# Patient Record
Sex: MALE | Race: WHITE | NOT HISPANIC OR LATINO | Employment: FULL TIME | URBAN - METROPOLITAN AREA
[De-identification: names, ages, dates, MRNs, and addresses within clinical notes are randomized per-mention and may not be internally consistent; named-entity substitution may affect disease eponyms.]

---

## 2022-05-19 DIAGNOSIS — N52.9 ERECTILE DYSFUNCTION: Primary | ICD-10-CM

## 2022-05-19 DIAGNOSIS — N40.1 BPH WITH URINARY OBSTRUCTION: ICD-10-CM

## 2022-05-19 DIAGNOSIS — N13.8 BPH WITH URINARY OBSTRUCTION: ICD-10-CM

## 2022-06-29 ENCOUNTER — OFFICE VISIT (OUTPATIENT)
Dept: UROLOGY | Facility: CLINIC | Age: 77
End: 2022-06-29
Payer: MEDICARE

## 2022-06-29 DIAGNOSIS — N40.1 BPH WITH URINARY OBSTRUCTION: ICD-10-CM

## 2022-06-29 DIAGNOSIS — N13.8 BPH WITH URINARY OBSTRUCTION: ICD-10-CM

## 2022-06-29 DIAGNOSIS — N52.9 ERECTILE DYSFUNCTION: ICD-10-CM

## 2022-06-29 DIAGNOSIS — N40.0 BENIGN PROSTATIC HYPERPLASIA, UNSPECIFIED WHETHER LOWER URINARY TRACT SYMPTOMS PRESENT: Primary | ICD-10-CM

## 2022-06-29 LAB
POC RESIDUAL URINE VOLUME: 0 ML (ref 0–100)
PSA, DIAGNOSTIC: 1.03 NG/ML (ref 0–4)

## 2022-06-29 PROCEDURE — 99204 PR OFFICE/OUTPT VISIT, NEW, LEVL IV, 45-59 MIN: ICD-10-PCS | Mod: S$GLB,,, | Performed by: NURSE PRACTITIONER

## 2022-06-29 PROCEDURE — 51798 US URINE CAPACITY MEASURE: CPT | Mod: S$GLB,,, | Performed by: NURSE PRACTITIONER

## 2022-06-29 PROCEDURE — 99204 OFFICE O/P NEW MOD 45 MIN: CPT | Mod: S$GLB,,, | Performed by: NURSE PRACTITIONER

## 2022-06-29 PROCEDURE — 51798 POCT BLADDER SCAN: ICD-10-PCS | Mod: S$GLB,,, | Performed by: NURSE PRACTITIONER

## 2022-06-29 RX ORDER — CLOTRIMAZOLE AND BETAMETHASONE DIPROPIONATE 10; .64 MG/G; MG/G
CREAM TOPICAL
COMMUNITY
Start: 2022-06-08

## 2022-06-29 RX ORDER — PRAVASTATIN SODIUM 10 MG/1
TABLET ORAL
COMMUNITY
Start: 2022-04-21

## 2022-06-29 RX ORDER — METFORMIN HYDROCHLORIDE 500 MG/1
TABLET, EXTENDED RELEASE ORAL
COMMUNITY
Start: 2022-05-18

## 2022-06-29 RX ORDER — SILDENAFIL 100 MG/1
TABLET, FILM COATED ORAL
COMMUNITY
Start: 2022-05-18

## 2022-06-29 RX ORDER — LISINOPRIL 20 MG/1
TABLET ORAL
COMMUNITY
Start: 2022-05-03

## 2022-06-29 RX ORDER — AMLODIPINE BESYLATE 10 MG/1
TABLET ORAL
COMMUNITY
Start: 2022-05-02

## 2022-06-29 RX ORDER — FINASTERIDE 5 MG/1
TABLET, FILM COATED ORAL
COMMUNITY
Start: 2022-04-21

## 2022-06-29 RX ORDER — TADALAFIL 20 MG/1
20 TABLET ORAL DAILY PRN
COMMUNITY
End: 2022-06-29

## 2022-06-29 RX ORDER — ALBUTEROL SULFATE 90 UG/1
AEROSOL, METERED RESPIRATORY (INHALATION)
COMMUNITY
Start: 2022-01-12

## 2022-06-29 RX ORDER — MONTELUKAST SODIUM 10 MG/1
TABLET ORAL
COMMUNITY
Start: 2022-05-17

## 2022-06-29 RX ORDER — ASPIRIN 81 MG/1
TABLET ORAL
COMMUNITY

## 2022-06-29 NOTE — PROGRESS NOTES
Subjective:       Patient ID: Eladio Mirza is a 76 y.o. male.    Chief Complaint: Benign Prostatic Hypertrophy (Also ED)      HPI: 76-year-old male new to the [facility] establish care.  He had been followed by Urology in [place].  He treated for a kidney stone which required a nephrostomy tube insertion with laser extraction of the stone.  This was a years back.  He has had no recurrence.  He is also followed for BPH with LUTS on finasteride 5 milligrams daily.  With good response.  He has a good stream which is easy to start.  He denies dysuria, frequency, urgency, incontinence or gross hematuria.  Rare nocturia.  Positive response to sildenafil 100 milligram p.r.n. for symptoms of erectile dysfunction.  No pain with ejaculation or blood in the semen.  No other urologic concerns.       Past Medical History:   Past Medical History:   Diagnosis Date    BPH (benign prostatic hyperplasia)     Cancer     leg    Hypercholesteremia     Hypertension     Kidney stone     Male erectile dysfunction, unspecified        Past Surgical Historical:   Past Surgical History:   Procedure Laterality Date    HEMORRHOID SURGERY      KIDNEY STONE SURGERY      LEG SURGERY Right     cancer        Medications:   Medication List with Changes/Refills   Current Medications    ALBUTEROL (PROVENTIL/VENTOLIN HFA) 90 MCG/ACTUATION INHALER    INHALE 2 PUFFS BY MOUTH EVERY 6 HOURS AS NEEDED FOR COUGH AND FOR WHEEZING AND FOR SHORTNESS OF BREATH    AMLODIPINE (NORVASC) 10 MG TABLET        ASPIRIN (ECOTRIN) 81 MG EC TABLET    aspirin 81 mg tablet,delayed release   Take 1 tablet every day by oral route.    CLOTRIMAZOLE-BETAMETHASONE 1-0.05% (LOTRISONE) CREAM    APPLY A SMALL AMOUNT OF CREAM TOPICALLY TO AFFECTED AREA TWICE DAILY FOR 7 DAYS    FINASTERIDE (PROSCAR) 5 MG TABLET        LISINOPRIL (PRINIVIL,ZESTRIL) 20 MG TABLET        METFORMIN (GLUCOPHAGE-XR) 500 MG ER 24HR TABLET        MONTELUKAST (SINGULAIR) 10 MG TABLET        PRAVASTATIN  (PRAVACHOL) 10 MG TABLET        SILDENAFIL (VIAGRA) 100 MG TABLET    TAKE 1 TABLET BY MOUTH AS DIRECTED 30 MINUTES TO 1 HOUR PRIOR TO INTERCOURSE    TADALAFIL (CIALIS) 20 MG TAB    Take 20 mg by mouth daily as needed.        Past Social History:   Social History     Socioeconomic History    Marital status:    Tobacco Use    Smoking status: Former Smoker    Smokeless tobacco: Never Used       Allergies: Review of patient's allergies indicates:  No Known Allergies     Family History:   Family History   Problem Relation Age of Onset    Stomach cancer Mother     Liver cancer Sister     Breast cancer Sister         Review of Systems:  Review of Systems   Constitutional: Negative for activity change and appetite change.   HENT: Negative for congestion and dental problem.    Eyes: Negative for visual disturbance.   Respiratory: Negative for chest tightness and shortness of breath.    Cardiovascular: Negative for chest pain.   Gastrointestinal: Negative for abdominal distention and abdominal pain.   Genitourinary: Negative for decreased urine volume, difficulty urinating, dysuria, enuresis, flank pain, frequency, genital sores, hematuria, penile discharge, penile pain, penile swelling, scrotal swelling, testicular pain and urgency.   Musculoskeletal: Negative for back pain and neck pain.   Skin: Negative for color change.   Neurological: Negative for dizziness.   Hematological: Negative for adenopathy.   Psychiatric/Behavioral: Negative for agitation, behavioral problems and confusion.       Physical Exam:  Physical Exam  Constitutional:       Appearance: He is well-developed.   HENT:      Head: Normocephalic.   Eyes:      General: No scleral icterus.  Pulmonary:      Effort: Pulmonary effort is normal.      Breath sounds: Normal breath sounds.   Abdominal:      General: There is no distension.      Palpations: Abdomen is soft.      Tenderness: There is no abdominal tenderness.      Hernia: No hernia is  present. There is no hernia in the right inguinal area or left inguinal area.   Genitourinary:     Penis: Normal.       Testes: Normal. Cremasteric reflex is present.      Prostate: Enlarged (Smooth symmetrical benign-feeling without nodule induration or tenderness).   Musculoskeletal:      Cervical back: Normal range of motion.   Skin:     General: Skin is warm and dry.   Neurological:      Mental Status: He is alert and oriented to person, place, and time.         Assessment/Plan:   BPH with LUTS--symptoms resolved with finasteride 5 milligram daily.  Adequate on hand.  Urinalysis negative for infection.  PVR 0 mL.  PSA today    Erectile dysfunction--positive response to sildenafil 100 milligram p.r.n. adequate on hand    History of kidney stone--non recurrence symptoms no blood in the urine today monitor for progression   Problem List Items Addressed This Visit    None     Visit Diagnoses     Erectile dysfunction        BPH with urinary obstruction        Relevant Orders    POCT Urinalysis (w/Micro Option)    POCT Bladder Scan

## 2022-06-30 ENCOUNTER — TELEPHONE (OUTPATIENT)
Dept: UROLOGY | Facility: CLINIC | Age: 77
End: 2022-06-30
Payer: MEDICARE

## 2022-06-30 NOTE — TELEPHONE ENCOUNTER
----- Message from Kylie Walker sent at 6/30/2022  4:58 PM CDT -----    ----- Message -----  From: Mary Mendoza  Sent: 6/30/2022   2:45 PM CDT  To: [facility] Staff    Type:  Needs Medical Advice    Who Called: Miya pace/ Dr Ruvalcaba's office (pt Eladio Mirza)  Symptoms (please be specific): -   How long has patient had these symptoms:  -  Pharmacy name and phone #:  -  Would the patient rather a call back or a response via [facility]?    Best Call Back Number:  [phone]  Additional Information:  Can the lab report from pt's visit be faxed to us since he's here  [phone] (fax)

## 2022-06-30 NOTE — TELEPHONE ENCOUNTER
Left message for pt to call back in regards to psa results. ED----- Message from Tadeo Larios NP sent at 6/30/2022  7:44 AM CDT -----  Please notify patient of lab results within stable range

## 2025-02-05 ENCOUNTER — ANESTHESIA EVENT (OUTPATIENT)
Dept: GASTROENTEROLOGY | Facility: HOSPITAL | Age: 80
End: 2025-02-05
Payer: MEDICARE

## 2025-02-06 ENCOUNTER — HOSPITAL ENCOUNTER (OUTPATIENT)
Dept: PREADMISSION TESTING | Facility: HOSPITAL | Age: 80
Discharge: HOME | End: 2025-02-06
Attending: INTERNAL MEDICINE
Payer: MEDICARE

## 2025-02-06 VITALS — HEIGHT: 73 IN | BODY MASS INDEX: 27.73 KG/M2 | WEIGHT: 209.19 LBS

## 2025-02-06 DIAGNOSIS — Z12.11 SCREENING FOR COLON CANCER: ICD-10-CM

## 2025-02-06 DIAGNOSIS — Z86.0100 HX OF COLONIC POLYPS: Primary | ICD-10-CM

## 2025-02-06 DIAGNOSIS — Z12.11 COLON CANCER SCREENING: ICD-10-CM

## 2025-02-06 RX ORDER — PIOGLITAZONEHYDROCHLORIDE 15 MG/1
15 TABLET ORAL DAILY
COMMUNITY

## 2025-02-06 NOTE — DISCHARGE INSTRUCTIONS

## 2025-02-08 NOTE — ANESTHESIA PREPROCEDURE EVALUATION
02/08/2025  Eladio Mirza is a 79 y.o., male.  urgical History    Procedure Laterality Date Comment Source   HEMORRHOID SURGERY       KIDNEY STONE SURGERY       LEG SURGERY Right  cancer      Medical History      Diagnosis Date Comment Source   BPH (benign prostatic hyperplasia)      Cancer  leg    Diabetes mellitus      Hypercholesteremia      Hypertension      Kidney stone      Long term exposure to asbestos      Male erectile dysfunction, unspecified      Unspecified viral hepatitis B without hepatic coma          Pre-op Assessment    I have reviewed the Patient Summary Reports.     I have reviewed the Nursing Notes. I have reviewed the NPO Status.   I have reviewed the Medications.     Review of Systems  Anesthesia Hx:             Denies Family Hx of Anesthesia complications.    Denies Personal Hx of Anesthesia complications.                    Hematology/Oncology:  Hematology Normal   Oncology Normal                                   EENT/Dental:  EENT/Dental Normal           Cardiovascular:     Hypertension                                    Hypertension         Renal/:  Chronic Renal Disease        Kidney Function/Disease             Hepatic/GI:      Liver Disease, Hepatitis           Liver Disease, Hepatitis        Musculoskeletal:  Musculoskeletal Normal                Neurological:  Neurology Normal                                      Endocrine:  Diabetes    Diabetes                      Dermatological:  Skin Normal    Psych:  Psychiatric Normal                    Physical Exam  General: Well nourished, Cooperative, Alert and Oriented    Airway:  Mallampati: II / II  Mouth Opening: Normal  TM Distance: Normal  Tongue: Normal  Neck ROM: Normal ROM        Anesthesia Plan  Type of Anesthesia, risks & benefits discussed:    Anesthesia Type: MAC  Intra-op Monitoring Plan: Standard ASA Monitors  Post  Op Pain Control Plan: multimodal analgesia  Induction:  IV  Airway Plan: Direct  Informed Consent: Informed consent signed with the Patient and all parties understand the risks and agree with anesthesia plan.  All questions answered. Patient consented to blood products? Yes  ASA Score: 3  Day of Surgery Review of History & Physical: H&P Update referred to the surgeon/provider.I have interviewed and examined the patient. I have reviewed the patient's H&P dated: There are no significant changes.     Ready For Surgery From Anesthesia Perspective.     .    Anesthesia History    No specialty history recorded    Other Medical History   BPH (benign prostatic hyperplasia) Hypertension   Hypercholesteremia Male erectile dysfunction, unspecified   Kidney stone Cancer   Diabetes mellitus Unspecified viral hepatitis B without hepatic coma   Long term exposure to asbestos      Surgical History    HEMORRHOID SURGERY KIDNEY STONE SURGERY   LEG SURGERY

## 2025-02-10 ENCOUNTER — ANESTHESIA (OUTPATIENT)
Dept: GASTROENTEROLOGY | Facility: HOSPITAL | Age: 80
End: 2025-02-10
Payer: MEDICARE

## 2025-02-10 ENCOUNTER — HOSPITAL ENCOUNTER (OUTPATIENT)
Dept: GASTROENTEROLOGY | Facility: HOSPITAL | Age: 80
Discharge: HOME | End: 2025-02-10
Attending: INTERNAL MEDICINE
Payer: MEDICARE

## 2025-02-10 VITALS
TEMPERATURE: 97 F | BODY MASS INDEX: 27.6 KG/M2 | RESPIRATION RATE: 18 BRPM | HEART RATE: 68 BPM | WEIGHT: 209.19 LBS | SYSTOLIC BLOOD PRESSURE: 136 MMHG | DIASTOLIC BLOOD PRESSURE: 67 MMHG | OXYGEN SATURATION: 100 %

## 2025-02-10 DIAGNOSIS — Z12.11 SCREENING FOR COLON CANCER: ICD-10-CM

## 2025-02-10 DIAGNOSIS — Z86.0100 HX OF COLONIC POLYPS: ICD-10-CM

## 2025-02-10 DIAGNOSIS — Z12.11 COLON CANCER SCREENING: ICD-10-CM

## 2025-02-10 LAB — POCT GLUCOSE: 153 MG/DL (ref 70–110)

## 2025-02-10 PROCEDURE — G0105 COLORECTAL SCRN; HI RISK IND: HCPCS | Performed by: FAMILY MEDICINE

## 2025-02-10 PROCEDURE — 37000008 HC ANESTHESIA 1ST 15 MINUTES

## 2025-02-10 PROCEDURE — 37000009 HC ANESTHESIA EA ADD 15 MINS

## 2025-02-10 PROCEDURE — D9220A PRA ANESTHESIA: Mod: ,,, | Performed by: NURSE ANESTHETIST, CERTIFIED REGISTERED

## 2025-02-10 PROCEDURE — 63600175 PHARM REV CODE 636 W HCPCS: Performed by: NURSE ANESTHETIST, CERTIFIED REGISTERED

## 2025-02-10 PROCEDURE — 82962 GLUCOSE BLOOD TEST: CPT

## 2025-02-10 RX ORDER — LIDOCAINE HYDROCHLORIDE 20 MG/ML
INJECTION INTRAVENOUS
Status: DISCONTINUED | OUTPATIENT
Start: 2025-02-10 | End: 2025-02-10

## 2025-02-10 RX ORDER — PROPOFOL 10 MG/ML
VIAL (ML) INTRAVENOUS
Status: DISCONTINUED | OUTPATIENT
Start: 2025-02-10 | End: 2025-02-10

## 2025-02-10 RX ORDER — GLYCOPYRROLATE 0.2 MG/ML
INJECTION INTRAMUSCULAR; INTRAVENOUS
Status: DISCONTINUED | OUTPATIENT
Start: 2025-02-10 | End: 2025-02-10

## 2025-02-10 RX ADMIN — GLYCOPYRROLATE 0.2 MG: 0.2 INJECTION INTRAMUSCULAR; INTRAVENOUS at 06:02

## 2025-02-10 RX ADMIN — LIDOCAINE HYDROCHLORIDE 50 MG: 20 INJECTION, SOLUTION INTRAVENOUS at 06:02

## 2025-02-10 RX ADMIN — PROPOFOL 30 MG: 10 INJECTION, EMULSION INTRAVENOUS at 06:02

## 2025-02-10 RX ADMIN — PROPOFOL 30 MG: 10 INJECTION, EMULSION INTRAVENOUS at 07:02

## 2025-02-10 RX ADMIN — PROPOFOL 20 MG: 10 INJECTION, EMULSION INTRAVENOUS at 06:02

## 2025-02-10 RX ADMIN — PROPOFOL 60 MG: 10 INJECTION, EMULSION INTRAVENOUS at 06:02

## 2025-02-10 NOTE — DISCHARGE INSTRUCTIONS
Follow-up with Dr ETIENNE  Diet: as tolerated  Activity:  decrease activity today, no driving today, resume all activity tomorrow.  Notify MD:  increased swelling of abdomen, excessive nausea/vomiting, excessive bright red bleeding from rectum.  Medications:  continue your home medications. Keep a list of your home medications at all times for emergencies.

## 2025-02-10 NOTE — DISCHARGE SUMMARY
[facility]  Short Stay    Colonoscopy      OUTCOME: Patient tolerated treatment/procedure well without complication and is now ready for discharge.    DISPOSITION: Home or Self Care    FINAL DIAGNOSIS:  <principal problem not specified>    FOLLOWUP: In clinic    DISCHARGE INSTRUCTIONS:  No discharge procedures on file.     TIME SPENT ON DISCHARGE: 15 minutes

## 2025-02-10 NOTE — ANESTHESIA POSTPROCEDURE EVALUATION
Anesthesia Post Evaluation    Patient: Eladio Mirza    Procedure(s) Performed: * No procedures listed *    Final Anesthesia Type: MAC      Patient location during evaluation: GI PACU  Patient participation: Yes- Able to Participate  Level of consciousness: awake and alert, awake and oriented  Post-procedure vital signs: reviewed and stable  Pain management: adequate  Airway patency: patent    PONV status at discharge: No PONV  Anesthetic complications: no      Cardiovascular status: blood pressure returned to baseline  Respiratory status: unassisted, room air and spontaneous ventilation  Hydration status: euvolemic  Follow-up not needed.              Vitals Value Taken Time   /69 02/10/25 0546   Temp 36.4 °C (97.5 °F) 02/10/25 0546   Pulse 66 02/10/25 0546   Resp 18 02/10/25 0546   SpO2 98 % 02/10/25 0546         No case tracking events are documented in the log.      Pain/Dari Score: No data recorded